# Patient Record
Sex: MALE | Race: WHITE | NOT HISPANIC OR LATINO | Employment: FULL TIME | URBAN - METROPOLITAN AREA
[De-identification: names, ages, dates, MRNs, and addresses within clinical notes are randomized per-mention and may not be internally consistent; named-entity substitution may affect disease eponyms.]

---

## 2020-10-24 ENCOUNTER — VIRTUAL VISIT (OUTPATIENT)
Dept: FAMILY MEDICINE | Facility: OTHER | Age: 40
End: 2020-10-24

## 2020-10-25 NOTE — PROGRESS NOTES
"Date: 10/24/2020 12:02:15  Clinician: Ariana Gilbert  Clinician NPI: [id number]  Patient: Faiza Alvarez  Patient : 1980  Patient Address: [address] [place] [address]  Patient Phone: [phone]  Visit Protocol: URI  Patient Summary:  Faiza is a 40 year old ( : 1980 ) male who initiated a [facility] Visit for COVID-19 (Coronavirus) evaluation and screening. When asked the question \"Please sign me up to receive news, health information and promotions. \", Faiza responded \"No\".    Faiza states his symptoms started 1-2 days ago.   His symptoms consist of a headache, enlarged lymph nodes, a cough, anosmia, myalgia, and malaise. Faiza also feels feverish but was unable to measure his temperature.   Symptom details     Cough: Faiza coughs a few times an hour and his cough is not more bothersome at night. Phlegm does not come into his throat when he coughs. He does not believe his cough is caused by post-nasal drip.     Headache: He states the headache is mild (1-3 on a 10 point pain scale).      Faiza denies having ear pain, wheezing, nasal congestion, vomiting, rhinitis, nausea, facial pain or pressure, chills, sore throat, teeth pain, ageusia, and diarrhea. He also denies taking antibiotic medication in the past month and having recent facial or sinus surgery in the past 60 days. He is not experiencing dyspnea.   Precipitating events  He has not recently been exposed to someone with influenza. Faiza has been in close contact with the following high risk individuals: children under the age of 5.   Pertinent COVID-19 (Coronavirus) information  In the past 14 days, Faiza has not worked in a congregate living setting.   He does not work or volunteer as healthcare worker or a  and does not work or volunteer in a healthcare facility.   Faiza also has not lived in a congregate living setting in the past 14 days. He does not live with a healthcare worker.   " Faiza has not had a close contact with a laboratory-confirmed COVID-19 patient within 14 days of symptom onset.   Since December 2019, Faiza and has not had upper respiratory infection or influenza-like illness. Has not been diagnosed with lab-confirmed COVID-19 test   Pertinent medical history  Faiza had 1 sinus infection within the past year.   Faiza does not need a return to work/school note.   Weight: 230 lbs   Faiza does not smoke or use smokeless tobacco.   Weight: 230 lbs    MEDICATIONS: No current medications, ALLERGIES: NKDA  Clinician Response:  Dear Faiza,   Your symptoms show that you may have coronavirus (COVID-19). This illness can cause fever, cough and trouble breathing. Many people get a mild case and get better on their own. Some people can get very sick.  What should I do?  We would like to test you for this virus.   1. Please call [phone] to schedule your visit. Explain that you were referred by [facility] to have a COVID-19 test. Be ready to share your [facility] visit ID number.  Please note that if you are assessed for Covid-19 testing and receive an order for testing from [facility], that the scheduling of your Covid test at [facility] may be delayed by three or four days or more due to limited availability for testing. Additional options for testing can be found on the [place] Covid-19 Response website. https://[place].gov/covid19/    The following will serve as your written order for this COVID Test, ordered by me, for the indication of suspected COVID [Z20.828]: The test will be ordered in [employer], our electronic health record, after you are scheduled. It will show as ordered and authorized by Kelton Agudelo MD.  Order: COVID-19 (Coronavirus) PCR for SYMPTOMATIC testing from [facility].   2. When it's time for your COVID test:  Stay at least 6 feet away from others. (If someone will drive you to your test, stay in the backseat, as far away from the  as you can.)   Cover your mouth and  "nose with a mask, tissue or washcloth.  Go straight to the testing site. Don't make any stops on the way there or back.      3.Starting now: Stay home and away from others (self-isolate) until:   You've had no fever---and no medicine that reduces fever---for one full day (24 hours). And...   Your other symptoms have gotten better. For example, your cough or breathing has improved. And...   At least 10 days have passed since your symptoms started.       During this time, don't leave the house except for testing or medical care.   Stay in your own room, even for meals. Use your own bathroom if you can.   Stay away from others in your home. No hugging, kissing or shaking hands. No visitors.  Don't go to work, school or anywhere else.    Clean \"high touch\" surfaces often (doorknobs, counters, handles, etc.). Use a household cleaning spray or wipes. You'll find a full list of  on the EPA website: www.epa.gov/pesticide-registration/list-n-disinfectants-use-against-sars-cov-2.   Cover your mouth and nose with a mask, tissue or washcloth to avoid spreading germs.  Wash your hands and face often. Use soap and water.  Caregivers in these groups are at risk for severe illness due to COVID-19:  o People 65 years and older  o People who live in a nursing home or long-term care facility  o People with chronic disease (lung, heart, cancer, diabetes, kidney, liver, immunologic)  o People who have a weakened immune system, including those who:   Are in cancer treatment  Take medicine that weakens the immune system, such as corticosteroids  Had a bone marrow or organ transplant  Have an immune deficiency  Have poorly controlled HIV or AIDS  Are obese (body mass index of 40 or higher)  Smoke regularly   o Caregivers should wear gloves while washing dishes, handling laundry and cleaning bedrooms and bathrooms.  o Use caution when washing and drying laundry: Don't shake dirty laundry, and use the warmest water setting that you " can.  o For more tips, go to www.cdc.gov/coronavirus/2019-ncov/downloads/10Things.pdf.    4.Sign up for [employer]. We know it's scary to hear that you might have COVID-19. We want to track your symptoms to make sure you're okay over the next 2 weeks. Please look for an email from [employer]---this is a free, online program that we'll use to keep in touch. To sign up, follow the link in the email. Learn more at [email or web address]  How can I take care of myself?   Get lots of rest. Drink extra fluids (unless a doctor has told you not to).   Take Tylenol (acetaminophen) for fever or pain. If you have liver or kidney problems, ask your family doctor if it's okay to take Tylenol.   Adults can take either:    650 mg (two 325 mg pills) every 4 to 6 hours, or...   1,000 mg (two 500 mg pills) every 8 hours as needed.    Note: Don't take more than 3,000 mg in one day. Acetaminophen is found in many medicines (both prescribed and over-the-counter medicines). Read all labels to be sure you don't take too much.   For children, check the Tylenol bottle for the right dose. The dose is based on the child's age or weight.    If you have other health problems (like cancer, heart failure, an organ transplant or severe kidney disease): Call your specialty clinic if you don't feel better in the next 2 days.       Know when to call 911. Emergency warning signs include:    Trouble breathing or shortness of breath Pain or pressure in the chest that doesn't go away Feeling confused like you haven't felt before, or not being able to wake up Bluish-colored lips or face.  Where can I get more information?    [facility] -- About COVID-19: [email or web address]   CDC -- What to Do If You're Sick: www.cdc.gov/coronavirus/2019-ncov/about/steps-when-sick.html   CDC -- Ending Home Isolation: www.cdc.gov/coronavirus/2019-ncov/hcp/disposition-in-home-patients.html   [facility] -- Caring for Someone:  www.cdc.gov/coronavirus/2019-ncov/if-you-are-sick/care-for-someone.html   [facility] -- Interim Guidance for Hospital Discharge to Home: www.health.[facility].[place]/diseases/coronavirus/hcp/hospdischarge.pdf   [facility] clinical trials (COVID-19 research studies): clinicalaffairs.[facility]-clinical-trials    Below are the COVID-19 hotlines at the [place] Department of Health ([facility]). Interpreters are available.    For health questions: Call 584-258-3876 or 1-678.999.4780 (7 a.m. to 7 p.m.) For questions about schools and childcare: Call 620-386-8834 or 1-274.210.5790 (7 a.m. to 7 p.m.)    Diagnosis: Contact with and (suspected) exposure to other viral communicable diseases  Diagnosis ICD: Z20.828

## 2020-10-27 DIAGNOSIS — Z20.822 SUSPECTED 2019 NOVEL CORONAVIRUS INFECTION: Primary | ICD-10-CM

## 2020-10-27 PROCEDURE — U0003 INFECTIOUS AGENT DETECTION BY NUCLEIC ACID (DNA OR RNA); SEVERE ACUTE RESPIRATORY SYNDROME CORONAVIRUS 2 (SARS-COV-2) (CORONAVIRUS DISEASE [COVID-19]), AMPLIFIED PROBE TECHNIQUE, MAKING USE OF HIGH THROUGHPUT TECHNOLOGIES AS DESCRIBED BY CMS-2020-01-R: HCPCS | Performed by: FAMILY MEDICINE

## 2020-10-28 ENCOUNTER — TELEPHONE (OUTPATIENT)
Dept: NURSING | Facility: CLINIC | Age: 40
End: 2020-10-28

## 2020-10-28 LAB
SARS-COV-2 RNA SPEC QL NAA+PROBE: ABNORMAL
SPECIMEN SOURCE: ABNORMAL

## 2020-10-28 NOTE — TELEPHONE ENCOUNTER
"Coronavirus (COVID-19) Notification    Caller Name (Patient, parent, daughter/son, grandparent, etc)  Patient    Reason for call  Notify of Positive Coronavirus (COVID-19) lab results, assess symptoms,  review [facility] recommendations    Lab Result    Lab test:  2019-nCoV rRt-PCR or SARS-CoV-2 PCR    Oropharyngeal AND/OR nasopharyngeal swabs is POSITIVE for 2019-nCoV RNA/SARS-COV-2 PCR (COVID-19 virus)    RN Recommendations/Instructions per [facility] Coronavirus COVID-19 recommendations    Brief introduction script  Introduce self then review script:  \"I am calling on behalf of [id number].  We were notified that your Coronavirus test (COVID-19) for was POSITIVE for the virus.  I have some information to relay to you but first I wanted to mention that the [place] Dept of Health will be contacting you shortly [it's possible MD already called Patient] to talk to you more about how you are feeling and other people you have had contact with who might now also have the virus.  Also, [facility] is Partnering with the [facility] for Covid-19 research, you may be contacted directly by research staff.\"    Assessment (Inquire about Patient's current symptoms)   Assessment   Current Symptoms at time of phone call: (if no symptoms, document No symptoms] Slight cough, can't smell   Symptoms onset (if applicable) Fever and headache, sinus pressure,      If at time of call, Patients symptoms hare worsened, the Patient should contact 911 or have someone drive them to Emergency Dept promptly:      If Patient calling 911, inform 911 personal that you have tested positive for the Coronavirus (COVID-19).  Place mask on and await 911 to arrive.    If Emergency Dept, If possible, please have another adult drive you to the Emergency Dept but you need to wear mask when in contact with other people.      Review information with Patient    Your result was positive. This means you have COVID-19 (coronavirus).  " We have sent you a letter that reviews the information that I'll be reviewing with you now.    How can I protect others?    If you have symptoms: stay home and away from others (self-isolate) until:    You've had no fever--and no medicine that reduces fever--for 1 full day (24 hours). And       Your other symptoms have gotten better. For example, your cough or breathing has improved. And     At least 10 days have passed since your symptoms started. (If you've been told by a doctor that you have a weak immune system, wait 20 days.)     If you don't have symptoms: Stay home and away from others (self-isolate) until at least 10 days have passed since your first positive COVID-19 test. (Date test collected)    During this time:    Stay in your own room, including for meals. Use your own bathroom if you can.    Stay away from others in your home. No hugging, kissing or shaking hands. No visitors.     Don't go to work, school or anywhere else.     Clean  high touch  surfaces often (doorknobs, counters, handles, etc.). Use a household cleaning spray or wipes. You'll find a full list on the EPA website at www.epa.gov/pesticide-registration/list-n-disinfectants-use-against-sars-cov-2.     Cover your mouth and nose with a mask, tissue or other face covering to avoid spreading germs.    Wash your hands and face often with soap and water.    Caregivers in these groups are at risk for severe illness due to COVID-19:  o People 65 years and older  o People who live in a nursing home or long-term care facility  o People with chronic disease (lung, heart, cancer, diabetes, kidney, liver, immunologic)  o People who have a weakened immune system, including those who:  - Are in cancer treatment  - Take medicine that weakens the immune system, such as corticosteroids  - Had a bone marrow or organ transplant  - Have an immune deficiency  - Have poorly controlled HIV or AIDS  - Are obese (body mass index of 40 or higher)  - Smoke  regularly    Caregivers should wear gloves while washing dishes, handling laundry and cleaning bedrooms and bathrooms.    Wash and dry laundry with special caution. Don't shake dirty laundry, and use the warmest water setting you can.    If you have a weakened immune system, ask your doctor about other actions you should take.    For more tips, go to www.cdc.gov/coronavirus/2019-ncov/downloads/10Things.pdf.    You should not go back to work until you meet the guidelines above for ending your home isolation. You don't need to be retested for COVID-19 before going back to work--studies show that you won't spread the virus if it's been at least 10 days since your symptoms started (or 20 days, if you have a weak immune system).    Employers: This document serves as formal notice of your employee's medical guidelines for going back to work. They must meet the above guidelines before going back to work in person.    How can I take care of myself?    1. Get lots of rest. Drink extra fluids (unless a doctor has told you not to).    2. Take Tylenol (acetaminophen) for fever or pain. If you have liver or kidney problems, ask your family doctor if it's okay to take Tylenol.     Take either:     650 mg (two 325 mg pills) every 4 to 6 hours, or     1,000 mg (two 500 mg pills) every 8 hours as needed.     Note: Don't take more than 3,000 mg in one day. Acetaminophen is found in many medicines (both prescribed and over-the-counter medicines). Read all labels to be sure you don't take too much.    For children, check the Tylenol bottle for the right dose (based on their age or weight).    3. If you have other health problems (like cancer, heart failure, an organ transplant or severe kidney disease): Call your specialty clinic if you don't feel better in the next 2 days.    4. Know when to call 911: Emergency warning signs include:    Trouble breathing or shortness of breath    Pain or pressure in the chest that doesn't go  away    Feeling confused like you haven't felt before, or not being able to wake up    Bluish-colored lips or face    5. Sign up for [employer]. We know it's scary to hear that you have COVID-19. We want to track your symptoms to make sure you're okay over the next 2 weeks. Please look for an email from [employer]--this is a free, online program that we'll use to keep in touch. To sign up, follow the link in the email. Learn more at [email or web address].    Where can I get more information?    [facility]: [email or web address]    Coronavirus Basics: www.health.[facility].[place]./diseases/coronavirus/basics.html    What to Do If You're Sick: www.cdc.gov/coronavirus/2019-ncov/about/steps-when-sick.html    Ending Home Isolation: www.cdc.gov/coronavirus/2019-ncov/hcp/disposition-in-home-patients.html     Caring for Someone with COVID-19: www.cdc.gov/coronavirus/2019-ncov/if-you-are-sick/care-for-someone.html     [facility] clinical trials (COVID-19 research studies): clinicalaffairs.[facility]/n-clinical-trials     A Positive COVID-19 letter will be sent via [employer] or the mail. (Exception, no letters sent to Presurgerical/Preprocedure Patients)    [Name]  Elizabeth Manzano RN/[facility], 10/28/20, 4:06 PM.

## 2021-02-24 ENCOUNTER — OFFICE VISIT (OUTPATIENT)
Dept: FAMILY MEDICINE | Facility: CLINIC | Age: 41
End: 2021-02-24
Payer: COMMERCIAL

## 2021-02-24 VITALS
TEMPERATURE: 96.1 F | OXYGEN SATURATION: 95 % | BODY MASS INDEX: 30.09 KG/M2 | SYSTOLIC BLOOD PRESSURE: 128 MMHG | RESPIRATION RATE: 14 BRPM | HEART RATE: 92 BPM | DIASTOLIC BLOOD PRESSURE: 86 MMHG | WEIGHT: 227 LBS | HEIGHT: 73 IN

## 2021-02-24 DIAGNOSIS — R53.83 OTHER FATIGUE: ICD-10-CM

## 2021-02-24 DIAGNOSIS — Z13.220 SCREENING CHOLESTEROL LEVEL: ICD-10-CM

## 2021-02-24 DIAGNOSIS — Z13.1 SCREENING FOR DIABETES MELLITUS: ICD-10-CM

## 2021-02-24 DIAGNOSIS — R06.83 SNORING: Primary | ICD-10-CM

## 2021-02-24 LAB
CHOLEST SERPL-MCNC: 233 MG/DL
HBA1C MFR BLD: 5.4 % (ref 0–5.6)
HDLC SERPL-MCNC: 49 MG/DL
LDLC SERPL CALC-MCNC: 163 MG/DL
NONHDLC SERPL-MCNC: 184 MG/DL
TRIGL SERPL-MCNC: 104 MG/DL

## 2021-02-24 PROCEDURE — 99213 OFFICE O/P EST LOW 20 MIN: CPT | Performed by: FAMILY MEDICINE

## 2021-02-24 PROCEDURE — 36415 COLL VENOUS BLD VENIPUNCTURE: CPT | Performed by: FAMILY MEDICINE

## 2021-02-24 PROCEDURE — 83036 HEMOGLOBIN GLYCOSYLATED A1C: CPT | Performed by: FAMILY MEDICINE

## 2021-02-24 PROCEDURE — 80061 LIPID PANEL: CPT | Performed by: FAMILY MEDICINE

## 2021-02-24 ASSESSMENT — MIFFLIN-ST. JEOR: SCORE: 1987.51

## 2021-02-24 NOTE — PROGRESS NOTES
"    Assessment & Plan     Snoring  Other fatigue  Stop Bang score of 4.  Strong family history of obstructive sleep apnea.  He would like referral to sleep medicine for further evaluation and cares.  Order placed today.  - SLEEP EVALUATION & MANAGEMENT REFERRAL - ADULT -[facility] [place]  [phone] (Age 18 and up)    Screening cholesterol level  Due for cholesterol screening will obtain today  - Lipid panel reflex to direct LDL Fasting    Screening for diabetes mellitus  Strong family history of diabetes, due for screening, will obtain today.  - Hemoglobin A1c    Follow up as needed.       Matt Grove, DO  [facility] GÉNESIS Kendall is a 41 year old who presents for the following health issues   HPI     Chief Complaint   Patient presents with     Sleep study     requesting a sleep study. Recently got a smart watch, and it says he gets 1/2 hr of sleep each night and waking up 30-50 times a night. Also states that he has some dry mouth - states that his wife states he is snoring alot as well. States that his fathers and uncles all have sleep apnea as well.        Usually sleeps 8 hours per night.   Not real refreshed in the morning   Wife has complained about snoring   Stop bang score of 4  Will take naps during the day occasionally.   Drinks caffeine to wake up in the morning 2-3 cups  Minimal alcohol  1-2 times per month   No drugs   Non-smoker      Preventive Health   Due for cholesterol screening. Also with strong family history of diabetes.   Hep C and HIV testing deferred by patient.     Review of Systems   Constitutional, HEENT, cardiovascular, pulmonary, gi and gu systems are negative, except as otherwise noted.      Objective    /86 (BP Location: Left arm, Patient Position: Sitting, Cuff Size: Adult Large)   Pulse 92   Temp 96.1  F (35.6  C) (Tympanic)   Resp 14   Ht 1.861 m (6' 1.25\")   Wt 103 kg (227 lb)   SpO2 95%   BMI 29.74 kg/m    Body " mass index is 29.74 kg/m .  Physical Exam   General: alert, cooperative, no acute distress   HEENT: NC/AT, oropharynx clear, non-erythematous. No tonsil enlargement uvula midline.   Neck: 43 cm circumference.   CV: RRR, no murmur  Resp: non-labored breathing, clear to auscultation, no wheezing or rales

## 2021-02-24 NOTE — LETTER
February 24, 2021      aFiza Alvarez  [address] [address] [address]        Dear ,    We are writing to inform you of your test results.    Most recent diabetic testing has returned satisfactory and is within normal range.  Cholesterol testing shows a slightly elevated total cholesterol 233, and elevated LDL or lousy cholesterol at 163.  No need to start any medications at this time, however continue to work on healthy diet and exercise at least 150 minutes/week.     Dr. Matt Grove     Resulted Orders   Lipid panel reflex to direct LDL Fasting   Result Value Ref Range    Cholesterol 233 (H) <200 mg/dL      Comment:      Desirable:       <200 mg/dl    Triglycerides 104 <150 mg/dL    HDL Cholesterol 49 >39 mg/dL    LDL Cholesterol Calculated 163 (H) <100 mg/dL      Comment:      Above desirable:  100-129 mg/dl  Borderline High:  130-159 mg/dL  High:             160-189 mg/dL  Very high:       >189 mg/dl      Non HDL Cholesterol 184 (H) <130 mg/dL      Comment:      Above Desirable:  130-159 mg/dl  Borderline high:  160-189 mg/dl  High:             190-219 mg/dl  Very high:       >219 mg/dl     Hemoglobin A1c   Result Value Ref Range    Hemoglobin A1C 5.4 0 - 5.6 %      Comment:      Normal <5.7% Prediabetes 5.7-6.4%  Diabetes 6.5% or higher - adopted from ADA   consensus guidelines.         If you have any questions or concerns, please call the clinic at the number listed above.       Sincerely,      Matt Grove, DO

## 2021-02-24 NOTE — RESULT ENCOUNTER NOTE
Please call patient.     Most recent diabetic testing has returned satisfactory and is within normal range.  Cholesterol testing shows a slightly elevated total cholesterol 233, and elevated LDL or lousy cholesterol at 163.  No need to start any medications at this time, however continue to work on healthy diet and exercise at least 150 minutes/week.    Dr. Matt Grove

## 2021-03-12 ENCOUNTER — VIRTUAL VISIT (OUTPATIENT)
Dept: SLEEP MEDICINE | Facility: CLINIC | Age: 41
End: 2021-03-12
Attending: FAMILY MEDICINE
Payer: COMMERCIAL

## 2021-03-12 VITALS — WEIGHT: 225 LBS | BODY MASS INDEX: 28.88 KG/M2 | HEIGHT: 74 IN

## 2021-03-12 DIAGNOSIS — E66.3 OVERWEIGHT (BMI 25.0-29.9): ICD-10-CM

## 2021-03-12 DIAGNOSIS — G47.10 HYPERSOMNIA: ICD-10-CM

## 2021-03-12 DIAGNOSIS — R06.83 SNORING: Primary | ICD-10-CM

## 2021-03-12 PROCEDURE — 99204 OFFICE O/P NEW MOD 45 MIN: CPT | Mod: 95 | Performed by: PEDIATRICS

## 2021-03-12 RX ORDER — IBUPROFEN 200 MG
600 TABLET ORAL DAILY
COMMUNITY
End: 2022-06-22

## 2021-03-12 ASSESSMENT — MIFFLIN-ST. JEOR: SCORE: 1982.4

## 2021-03-12 NOTE — PATIENT INSTRUCTIONS
Your BMI is Body mass index is 29.28 kg/m .  Weight management is a personal decision.  If you are interested in exploring weight loss strategies, the following discussion covers the approaches that may be successful. Body mass index (BMI) is one way to tell whether you are at a healthy weight, overweight, or obese. It measures your weight in relation to your height.  A BMI of 18.5 to 24.9 is in the healthy range. A person with a BMI of 25 to 29.9 is considered overweight, and someone with a BMI of 30 or greater is considered obese. More than two-thirds of American adults are considered overweight or obese.  Being overweight or obese increases the risk for further weight gain. Excess weight may lead to heart disease and diabetes.  Creating and following plans for healthy eating and physical activity may help you improve your health.  Weight control is part of healthy lifestyle and includes exercise, emotional health, and healthy eating habits. Careful eating habits lifelong are the mainstay of weight control. Though there are significant health benefits from weight loss, long-term weight loss with diet alone may be very difficult to achieve- studies show long-term success with dietary management in less than 10% of people. Attaining a healthy weight may be especially difficult to achieve in those with severe obesity. In some cases, medications, devices and surgical management might be considered.  What can you do?  If you are overweight or obese and are interested in methods for weight loss, you should discuss this with your provider.     Consider reducing daily calorie intake by 500 calories.     Keep a food journal.     Avoiding skipping meals, consider cutting portions instead.    Diet combined with exercise helps maintain muscle while optimizing fat loss. Strength training is particularly important for building and maintaining muscle mass. Exercise helps reduce stress, increase energy, and improves fitness.  Increasing exercise without diet control, however, may not burn enough calories to loose weight.       Start walking three days a week 10-20 minutes at a time    Work towards walking thirty minutes five days a week     Eventually, increase the speed of your walking for 1-2 minutes at time    In addition, we recommend that you review healthy lifestyles and methods for weight loss available through the National Institutes of Health patient information sites:  http://win.niddk.nih.gov/publications/index.htm    And look into health and wellness programs that may be available through your health insurance provider, employer, local community center, or tosha club.    Weight management plan: Patient was referred to their PCP to discuss a diet and exercise plan.

## 2021-03-12 NOTE — PROGRESS NOTES
Faiza is a 41 year old who is being evaluated via a billable video visit.      How would you like to obtain your AVS? Mail a copy  If the video visit is dropped, the invitation should be resent by: Text to cell phone: [phone]  Will anyone else be joining your video visit? No   Ariana Greco CMA      Video Start Time: 8:00 AM  Video-Visit Details    Type of service:  Video Visit    Video End Time:8:17 AM    Originating Location (pt. Location): Home    Distant Location (provider location):  [facility]     Platform used for Video Visit: "[employer]     [facility]  Outpatient Sleep Medicine Consultation, New Patient      Name: Faiza Alvarez MRN# [id number]   Age: 41 year old YOB: 1980     Date of Consultation: March 12, 2021  Consultation is requested by: Matt Grove DO  [address] [place] [address]  Primary care provider: Matt Grove      Assessment and Plan:     1. Snoring  Patient is being evaluated for Obstructive Sleep Apnea (SHANE).  Risk factors for SHANE include:  snoring, excessive daytime sleepiness/subjective tiredness, male gender (STOP BANG score = 3).  Recommend HST since patient is at risk for SHANE without any relevant comorbid conditions.  He will follow up with me approximately two weeks after his sleep study has been completed to review the results and discuss plan of care.    2. Hypersomnia  The patient was strongly advised to avoid driving, operating any heavy machinery or engaging in other hazardous situations while drowsy or sleepy.  Patient was counseled on the importance of driving while alert and to pull over if drowsy.     3. Overweight (BMI 25.0-29.9)  Patients with overweight/obesity are at higher risk for sleep apnea due to fat deposition in the posterior airway and tongue.  Sleep disruption associated with untreated sleep disorders (including, but not limited to, sleep apnea) can  "cause hormonal disruption that predisposes individuals to gain weight.  Weight loss can lead to improvement in sleep apnea severity and sleep quality.      History:     Faiza Alvarez is a 41 year old male whose visit was conducted via video today.  The visit was converted to telephone due to technical difficulties with the audio.    Patient reports snoring which has been present for at least a year.  He does not wake up gasping during sleep and his wife has not observed apneic events.  However, his sleep is unrefreshing. He gets between 7-1/2 to 9 hours of sleep at night but does not awaken feeling refreshed in the morning.  His score on the [place] Sleepiness Scale is elevated at 14 out of 24.  He denies drowsy driving but has trouble focusing during his stay and has significantly reduced energy in the evening which has been frustrating to him.  He has a watch with a sleep tracker which reports suboptimal sleep data.  He finds it necessary to drink coffee in the morning in order to promote alertness.  Alcohol consumption is 1 or 2 times per month.  He has a strong family history of sleep disordered breathing.  He reports that his father, all of his father's brothers, and other family members use CPAP therapy.    Medical history is significant for overweight with a BMI of 29.3 kg/m  and hypercholesterolemia.  Patient takes no prescription medications and has never been diagnosed with hypertension.  Recent HbA1c was normal.    Medications:     Current Outpatient Medications   Medication Sig     ibuprofen (ADVIL/MOTRIN) 200 MG tablet Take 600 mg by mouth daily     No current facility-administered medications for this visit.         No Known Allergies    Past Medical History:     No past medical history on file.     Past Surgical History:      No past surgical history on file.    Physical Examination:   Ht 1.867 m (6' 1.5\")   Wt 102.1 kg (225 lb)   BMI 29.28 kg/m            Data: All pertinent previous " laboratory data reviewed     No results found for: PH, PHARTERIAL, PO2, ME0ENFCTSKE, SAT, PCO2, HCO3, BASEEXCESS, LILIAN, BEB  No results found for: TSH  No results found for: GLC  No results found for: HGB  No results found for: BUN, CR  No results found for: AST, ALT, GGT, ALKPHOS, BILITOTAL, BILICONJ, BILIDIRECT, J LUIS  No results found for: UAMP, UBARB, BENZODIAZEUR, UCANN, UCOC, OPIT, UPCP    Yesica Chavez MD   3/12/2021   [address], [address]  [place], [place] [id number] [phone]    Copy to: Matt Grove

## 2021-04-08 ENCOUNTER — OFFICE VISIT (OUTPATIENT)
Dept: SLEEP MEDICINE | Facility: CLINIC | Age: 41
End: 2021-04-08
Payer: COMMERCIAL

## 2021-04-08 DIAGNOSIS — E66.3 OVERWEIGHT (BMI 25.0-29.9): ICD-10-CM

## 2021-04-08 DIAGNOSIS — G47.10 HYPERSOMNIA: ICD-10-CM

## 2021-04-08 DIAGNOSIS — R06.83 SNORING: ICD-10-CM

## 2021-04-09 ENCOUNTER — DOCUMENTATION ONLY (OUTPATIENT)
Dept: SLEEP MEDICINE | Facility: CLINIC | Age: 41
End: 2021-04-09
Payer: COMMERCIAL

## 2021-04-09 ENCOUNTER — DOCUMENTATION ONLY (OUTPATIENT)
Dept: SLEEP MEDICINE | Facility: CLINIC | Age: 41
End: 2021-04-09

## 2021-04-09 PROCEDURE — G0399 HOME SLEEP TEST/TYPE 3 PORTA: HCPCS | Performed by: PEDIATRICS

## 2021-04-09 NOTE — PROGRESS NOTES
This HSAT was performed using a Noxturnal T3 device which recorded snore, sound, movement activity, body position, nasal pressure, oronasal thermal airflow, pulse, oximetry and both chest and abdominal respiratory effort. HSAT data was restricted to the time patient states they were in bed.     HSAT was scored using 1B 4% hypopnea rule.     HST AHI (Non-PAT): 44.7  Snoring was reported as mild.  Time with SpO2 below 89% was 12 minutes.   Overall signal quality was good     Pt will follow up with sleep provider to determine appropriate therapy.

## 2021-04-09 NOTE — PROCEDURES
"HOME SLEEP STUDY INTERPRETATION    Patient: Faiza Alvarez  MRN: [id number]  YOB: 1980  Study Date: 4/9/2021  Referring Provider: Matt Grove  Ordering Provider: Yesica Chavez MD     Indications for Home Study: Faiza Alvarez is a 41 year old male with snoring which has been present for at least a year.  He does not wake up gasping during sleep and his wife has not observed apneic events.  However, his sleep is unrefreshing. He gets between 7-1/2 to 9 hours of sleep at night but does not awaken feeling refreshed in the morning.  His score on the [place] Sleepiness Scale is elevated at 14 out of 24.  He denies drowsy driving but has trouble focusing during his stay and has significantly reduced energy in the evening which has been frustrating to him.  He has a watch with a sleep tracker which reports suboptimal sleep data.  He finds it necessary to drink coffee in the morning in order to promote alertness.  Alcohol consumption is 1 or 2 times per month.  He has a strong family history of sleep disordered breathing.  He reports that his father, all of his father's brothers, and other family members use CPAP therapy.  Medical history is significant for overweight with a BMI of 29.3 kg/m  and hypercholesterolemia.  Patient takes no prescription medications and has never been diagnosed with hypertension.  Recent HbA1c was normal.     Estimated body mass index is 29.28 kg/m  as calculated from the following:    Height as of 3/12/21: 1.867 m (6' 1.5\").    Weight as of 3/12/21: 102.1 kg (225 lb).  Total score - Mertens: 14 (3/11/2021  4:00 PM)  STOP-BANG: 3/8    Data: A full night home sleep study was performed recording the standard physiologic parameters including body position, movement, sound, nasal pressure, thermal oral airflow, chest and abdominal movements with respiratory inductance plethysmography, and oxygen saturation by pulse oximetry. Pulse rate was estimated by oximetry " recording. This study was considered adequate based on > 4 hours of quality oximetry and respiratory recording. As specified by the AASM Manual for the Scoring of Sleep and Associated events, version 2.3, Rule VIII.D 1B, 4% oxygen desaturation scoring for hypopneas is used as a standard of care on all home sleep apnea testing.    Analysis Time:  381.6 minutes    Respiration:   Sleep Associated Hypoxemia: sustained hypoxemia was present. Baseline oxygen saturation was 93%.  Time with saturation less than or equal to 88% was 7 minutes. The lowest oxygen saturation was 80%.   Snoring: Snoring was present.  Respiratory events: The home study revealed a presence of 50 obstructive apneas and 103 mixed and central apneas. There were 129 hypopneas resulting in a combined apnea/hypopnea index [AHI] of 44.7 events per hour.  AHI was 44.7 per hour supine (patient spent the entire night in the supine position).   Pattern: Excluding events noted above, respiratory rate and pattern was Normal.    Position: Percent of time spent: supine - 99.2%, prone - 0%, on left - 0%, on right - 0%.    Heart Rate: By pulse oximetry normal rate was noted.     Assessment:   Severe obstructive sleep apnea.  Sleep associated hypoxemia was present.    Recommendations:  Consider auto-CPAP at 5-15 cmH2O.  Suggest optimizing sleep hygiene and avoiding sleep deprivation.  Weight management.    Diagnosis Code(s): Obstructive Sleep Apnea G47.33, Hypoxemia G47.36    Yesica Chavez MD, April 9, 2021   Diplomate, American Board of Pediatrics, Sleep Medicine

## 2021-04-19 VITALS — HEIGHT: 74 IN | BODY MASS INDEX: 28.88 KG/M2 | WEIGHT: 225 LBS

## 2021-04-19 ASSESSMENT — MIFFLIN-ST. JEOR: SCORE: 1982.47

## 2021-04-19 NOTE — PROGRESS NOTES
"Faiza Alvarez is a 41 year old male being evaluated via a billable telephone visit.     \"This telephone visit will be conducted via a call between you and your physician/provider. We have found that certain health care needs can be provided without the need for an in-person visit or physical exam.  This service lets us provide the care you need with a telephone conversation.  If a prescription is necessary we can send it directly to your pharmacy.  If lab work is needed we can place an order for that and you can then stop by our lab to have the test done at a later time.\"    Telephone visits are billed at different rates depending on your insurance coverage.  Please reach out to your insurance provider with any questions.    Patient has given verbal consent for  a Telephone visit? Yes    What telephone number would you like your provider to contact at at:  [phone]    How would you like to obtain your AVS? Mail a copy    Ashley Herr MA    Telephone Visit Details:     Telephone Visit Start Time: 9:00 AM    Telephone Visit End Time:9:11 AM      [facility]  Outpatient Sleep Medicine Encounter, Established Patient      Name: Faiza Alvarez MRN# [id number]   Age: 41 year old YOB: 1980     Date of Visit: April 21, 2021  Primary care provider: Matt Grove    Assessment and Plan:     1. Snoring  Today I submitted a prescription for AutoPAP therapy, 5 to 15 cm water pressure, patient's choice of PAP mask, and all associated consumable supplies.  I counseled the patient to use the treatment nightly and throughout the entire sleep period.  The Sleep Therapy Management (STM) group will contact the patient as per protocol for support and troubleshooting.  Patient will be asked to schedule a follow-up appointment to determine clinical progress with the therapy and assess adherence.    2. Hypoxemia  I expect hypoxemia to resolve with adequate " treatment of SHANE; if there are any persistent symptoms further evaluation may be necessary.    3. Hypersomnia  The patient is advised to avoid driving, operating any heavy machinery or engaging in other hazardous situations while drowsy or sleepy.      History:   Faiza Alvarez is a 41 year old male whose visit today is to review the results of a home sleep apnea test.  He has had snoring which has been present for at least a year.  He does not wake up gasping during sleep and his wife has not observed apneic events.  However, his sleep is unrefreshing. He gets between 7-1/2 to 9 hours of sleep at night but does not awaken feeling refreshed in the morning.  His score on the [place] Sleepiness Scale is elevated at 14 out of 24.  He denies drowsy driving but has trouble focusing during his day and has significantly reduced energy in the evening.  He finds it necessary to drink coffee in the morning in order to promote alertness.  Alcohol consumption is 1 or 2 times per month.  He has a strong family history of sleep disordered breathing.  He reports that his father, all of his father's brothers, and other family members use CPAP therapy.  Medical history is significant for overweight with a BMI of 29.3 kg/m  and hypercholesterolemia.  Patient takes no prescription medications and has never been diagnosed with hypertension.  Recent HbA1c was normal.     I reviewed the home sleep apnea test conducted on April 9, 2021.  The test shows severe obstructive sleep apnea with an AHI of 44.7 events per hour.  There is also mild hypoxemia due to the cumulative effects of episodic desaturations.  Time with saturation less than or equal to 88% was 7 minutes.  The oxyhemoglobin saturation dia was 80%.  The patient slept in the supine position.    Recommended AutoPap therapy at 5 to 15 cm water pressure to treat sleep disordered breathing.    Medications:     Current Outpatient Medications   Medication Sig     ibuprofen  "(ADVIL/MOTRIN) 200 MG tablet Take 600 mg by mouth daily     No current facility-administered medications for this visit.         No Known Allergies    Past Medical History:   No past medical history on file.     Physical Examination:   Ht 1.867 m (6' 1.5\")   Wt 102.1 kg (225 lb)   BMI 29.28 kg/m           This note has been dictated using voice recognition software. Any grammatical, typographical, or context distortions are unintentional and inherent to the software.    Yesica Chavez MD   4/21/2021   [address], [facility] [address]  [place] [address]   [phone]    Copy to: Matt Grove   "

## 2021-04-21 ENCOUNTER — VIRTUAL VISIT (OUTPATIENT)
Dept: SLEEP MEDICINE | Facility: CLINIC | Age: 41
End: 2021-04-21
Payer: COMMERCIAL

## 2021-04-21 DIAGNOSIS — R06.83 SNORING: Primary | ICD-10-CM

## 2021-04-21 DIAGNOSIS — G47.10 HYPERSOMNIA: ICD-10-CM

## 2021-04-21 DIAGNOSIS — R09.02 HYPOXEMIA: ICD-10-CM

## 2021-04-21 PROCEDURE — 99213 OFFICE O/P EST LOW 20 MIN: CPT | Mod: 95 | Performed by: PEDIATRICS

## 2021-04-28 ENCOUNTER — DOCUMENTATION ONLY (OUTPATIENT)
Dept: SLEEP MEDICINE | Facility: CLINIC | Age: 41
End: 2021-04-28

## 2021-04-28 NOTE — PROGRESS NOTES
Patient was offered choice of vendor and chose [facility].  Patient Faiza Alvarez was set up at [place]  on April 28, 2021. Patient received a Resmed AirSense 10 Auto. Pressures were set at 5-15 cm H2O.   Patient s ramp is off cm H2O for Off and FLEX/EPR is 2.  Patient received a Resmed Mask name: [id number]  Nasal mask size Medium, heated tubing and heated humidifier.  Patient does need to meet compliance.   Светлана Lopez

## 2021-05-03 ENCOUNTER — DOCUMENTATION ONLY (OUTPATIENT)
Dept: SLEEP MEDICINE | Facility: CLINIC | Age: 41
End: 2021-05-03

## 2021-05-03 NOTE — PROGRESS NOTES
3 day Sleep therapy management telephone visit    Diagnostic AHI:   44.7  HST    Confirmed with patient at time of call- Yes Patient is still interested in STM service       Subjective measures:  Pt states things are going well and has no issues or complaints.  Pt is benefiting from therapy.      Replacement device: No  STM ordered by provider: Yes    Objective data     Order Settings for PAP  CPAP min 5    CPAP max 15        Device settings from machine CPAP min 5.0     CPAP max 15.0      EPR Setting TWO    RESMED soft response  OFF     Assessment: Nightly usage over four hours Elevated CA     Action plan: Patient to have 14 day STM visit. Patient has a follow up visit scheduled:   yes within 31-90 days of set up.      Total time spent on accessing and  interpreting remote patient PAP therapy data  10 minutes    Total time spent counseling, coaching  and reviewing PAP therapy data with patient 3minutes    [id number] no

## 2021-05-13 ENCOUNTER — DOCUMENTATION ONLY (OUTPATIENT)
Dept: SLEEP MEDICINE | Facility: CLINIC | Age: 41
End: 2021-05-13

## 2021-05-13 NOTE — PROGRESS NOTES
14  DAY STM VISIT    Diagnostic AHI:   44.7  HST    Subjective measures:  Patient reports that he feels things are going well.  He feels like he needs more pressure during the night, but he wants to wait until 30 day to see about making any changes to his current settings.      Assessment: Pt not meeting objective benchmarks for AHI, primarly central events. Patient failing following subjective benchmarks: pressure issues    Action plan: pt to have 30 day STM visit.      Device type: Auto-CPAP    PAP settings: CPAP min 5.0 cm  H20       CPAP max 15.0 cm  H20      95th% pressure 9.6 cm  H20        RESMED EPR level Setting: TWO    RESMED Soft response setting:  OFF    Mask type:  Nasal Mask    Objective measures: 14 day rolling measures      Compliance  100 %      Leak  4.88  lpm  last  upload      AHI 12.01   last  Upload-primary central events       Average number of minutes 454      Objective measure goal  Compliance   Goal >70%  Leak   Goal < 24 lpm  AHI  Goal < 5  Usage  Goal >240        Total time spent on accessing and interpreting remote patient PAP therapy data  10 minutes    Total time spent counseling, coaching  and reviewing PAP therapy data with patient 3 minutes    [id number]  [id number]  no (3 day STM)

## 2021-06-03 ENCOUNTER — DOCUMENTATION ONLY (OUTPATIENT)
Dept: SLEEP MEDICINE | Facility: CLINIC | Age: 41
End: 2021-06-03

## 2021-06-03 DIAGNOSIS — G47.33 OSA (OBSTRUCTIVE SLEEP APNEA): Primary | ICD-10-CM

## 2021-06-03 NOTE — Clinical Note
Hi, can you please sign pended order for pressure increase for patient comfort? Resmed 6-15 cm h2o.  Residual AHI is slightly elevated, primarily central apneas.

## 2021-06-03 NOTE — PROGRESS NOTES
30 DAY STM VISIT    Diagnostic AHI:  44.7 HST     Subjective measures:  Patient is waking up during the night feeling like he can't breath. He said this happens while on his back and his side.   Patient feels like pressure is a little low.    Assessment: Pt not meeting objective benchmarks for AHI. Upload is showing primarily central apneas.  Patient failing following subjective benchmarks: pressure issues  Action plan: pended order placed to provider for pressure change to 6-15 cm h2o.  Patient has scheduled a follow up visit with Dr. Phan on 6/18/21.   Device type: Auto-CPAP  PAP settings: CPAP min 5.0 cm  H20     CPAP max 15.0 cm  H20    95th% pressure 9.5 cm  H20      RESMED EPR level Setting: TWO    RESMED Soft response setting:  OFF  Mask type:  Nasal Mask  Objective measures: 14 day rolling measures      Compliance  92 %      Leak  7.44 lpm  last  upload      AHI 9.82   last  upload      Average number of minutes 409      Objective measure goal  Compliance   Goal >70%  Leak   Goal < 24 lpm  AHI  Goal < 5  Usage  Goal >240        Total time spent on accessing and interpreting remote patient PAP therapy data  5 minutes    Total time spent counseling, coaching  and reviewing PAP therapy data with patient  10 minutes     [id number] this call  [id number] no  at 3 or 14 day [facility]

## 2021-06-18 ENCOUNTER — VIRTUAL VISIT (OUTPATIENT)
Dept: SLEEP MEDICINE | Facility: CLINIC | Age: 41
End: 2021-06-18
Payer: COMMERCIAL

## 2021-06-18 VITALS — WEIGHT: 235 LBS | HEIGHT: 74 IN | BODY MASS INDEX: 30.16 KG/M2

## 2021-06-18 DIAGNOSIS — G47.33 OSA (OBSTRUCTIVE SLEEP APNEA): Primary | ICD-10-CM

## 2021-06-18 PROCEDURE — 99213 OFFICE O/P EST LOW 20 MIN: CPT | Mod: 95 | Performed by: PEDIATRICS

## 2021-06-18 ASSESSMENT — MIFFLIN-ST. JEOR: SCORE: 2035.7

## 2021-06-18 NOTE — PATIENT INSTRUCTIONS
Your BMI is Body mass index is 30.17 kg/m .  Weight management is a personal decision.  If you are interested in exploring weight loss strategies, the following discussion covers the approaches that may be successful. Body mass index (BMI) is one way to tell whether you are at a healthy weight, overweight, or obese. It measures your weight in relation to your height.  A BMI of 18.5 to 24.9 is in the healthy range. A person with a BMI of 25 to 29.9 is considered overweight, and someone with a BMI of 30 or greater is considered obese. More than two-thirds of American adults are considered overweight or obese.  Being overweight or obese increases the risk for further weight gain. Excess weight may lead to heart disease and diabetes.  Creating and following plans for healthy eating and physical activity may help you improve your health.  Weight control is part of healthy lifestyle and includes exercise, emotional health, and healthy eating habits. Careful eating habits lifelong are the mainstay of weight control. Though there are significant health benefits from weight loss, long-term weight loss with diet alone may be very difficult to achieve- studies show long-term success with dietary management in less than 10% of people. Attaining a healthy weight may be especially difficult to achieve in those with severe obesity. In some cases, medications, devices and surgical management might be considered.  What can you do?  If you are overweight or obese and are interested in methods for weight loss, you should discuss this with your provider.     Consider reducing daily calorie intake by 500 calories.     Keep a food journal.     Avoiding skipping meals, consider cutting portions instead.    Diet combined with exercise helps maintain muscle while optimizing fat loss. Strength training is particularly important for building and maintaining muscle mass. Exercise helps reduce stress, increase energy, and improves fitness.  Increasing exercise without diet control, however, may not burn enough calories to loose weight.       Start walking three days a week 10-20 minutes at a time    Work towards walking thirty minutes five days a week     Eventually, increase the speed of your walking for 1-2 minutes at time    In addition, we recommend that you review healthy lifestyles and methods for weight loss available through the National Institutes of Health patient information sites:  http://win.niddk.nih.gov/publications/index.htm    And look into health and wellness programs that may be available through your health insurance provider, employer, local community center, or tosha club.    Weight management plan: Patient was referred to their PCP to discuss a diet and exercise plan.

## 2021-06-18 NOTE — PROGRESS NOTES
Does patient have any form of state insurance? n    Do you have wifi? y  Do you have a smart phone? y  Can you download an charissa on your phone comfortably with out assistance? y  Can you watch a Youtube video? y    Faiza is a 41 year old who is being evaluated via a billable video visit.      How would you like to obtain your AVS? MyChart  If the video visit is dropped, the invitation should be resent by: Text to cell phone: [phone]   Will anyone else be joining your video visit? No      Lori Garcia, NIALL on 6/18/2021 at 10:15 AM    Video Start Time: 10:59 AM  Video-Visit Details    Type of service:  Video Visit    Video End Time:11:11 AM    Originating Location (pt. Location): Home    Distant Location (provider location):  [facility]     Platform used for Video Visit: [facility]  Outpatient Sleep Medicine Encounter, Established Patient      Name: Faiza Alvarez MRN# [id number]   Age: 41 year old YOB: 1980     Date of Visit: June 18, 2021  Primary care provider: Matt Grove    Assessment and Plan:     1. SHANE (obstructive sleep apnea)  I advised the patient to continue PAP therapy use nightly and throughout the entire sleep period.  He has improvement of air hunger with increasing the lower limit of his pressure range to 6.  Since his air hunger is not completely resolved, I increased the lower limit to 7 cm water.  I also reduced the upper limit to 11 cm water.  I expect to see the number of central apneas reduce over time.  At this point there is no cause for concern.  Follow up in 2 to 3 months for recheck.    History:     Faiza Alvarez is a 41 year old male whose visit was conducted via video today.  This is his first follow-up visit since initiating treatment with CPAP for obstructive sleep apnea.  His score on the [place] Sleepiness Scale is 6 out of 24, improved from 14 out of 24 prior  "to treatment.  He was set with a ResMed air sense 10 AutoPap on April 28, 2021.  He uses a nasal mask.  Notes from the sleep therapy management group indicate that he has had the sensation the pressure is not high enough; the lower limit of his pressure setting was increased to 6 cm water.  He reports improvement from this but still has occasional awakenings feeling that the pressure is too low.  At this point it is more difficult for him to return to sleep since he has already satisfied most of his sleep meet.  Otherwise he seems to benefit clinically from PAP therapy with improved sleep quality and daytime alertness.    Patient meets criteria for compliance with CPAP (see below).    ResMed   Auto-PAP 6.0 - 15.0 cmH2O 30 day usage data:    93% of days with > 4 hours of use. 0/30 days with no use.   Average use 402 minutes per day.   95%ile Leak 7.05 L/min.   CPAP 95% pressure 9.4 cm.   AHI 8.77 events per hour.     A home sleep apnea test was conducted on April 9, 2021.  The test shows severe obstructive sleep apnea with an AHI of 44.7 events per hour.  There is also mild hypoxemia due to the cumulative effects of episodic desaturations.  Time with saturation less than or equal to 88% was 7 minutes.  The oxyhemoglobin saturation dia was 80%.  The patient slept in the supine position.    Medications:     Current Outpatient Medications   Medication Sig     ibuprofen (ADVIL/MOTRIN) 200 MG tablet Take 600 mg by mouth daily     No current facility-administered medications for this visit.         No Known Allergies    Past Medical History:   No past medical history on file.     Physical Examination:   Ht 1.88 m (6' 2\")   Wt 106.6 kg (235 lb)   BMI 30.17 kg/m           This note has been dictated using voice recognition software. Any grammatical, typographical, or context distortions are unintentional and inherent to the software.    Yesica Chavez MD   6/18/2021   [facility] [place]  [address] [place] [address], [address]  [place] [address]   [phone]    Copy to: Matt Grove

## 2021-06-20 ENCOUNTER — HEALTH MAINTENANCE LETTER (OUTPATIENT)
Age: 41
End: 2021-06-20

## 2021-10-11 ENCOUNTER — HEALTH MAINTENANCE LETTER (OUTPATIENT)
Age: 41
End: 2021-10-11

## 2022-05-27 ENCOUNTER — E-VISIT (OUTPATIENT)
Dept: FAMILY MEDICINE | Facility: CLINIC | Age: 42
End: 2022-05-27
Payer: COMMERCIAL

## 2022-05-27 DIAGNOSIS — Z53.9 DIAGNOSIS NOT YET DEFINED: Primary | ICD-10-CM

## 2022-05-31 NOTE — PATIENT INSTRUCTIONS
Thank you for choosing us for your care. I think an in-clinic visit would be best next steps based on your symptoms. Please schedule a clinic appointment; you won t be charged for this eVisit.      You can schedule an appointment right here in [facility], or call [phone]

## 2022-06-22 ENCOUNTER — OFFICE VISIT (OUTPATIENT)
Dept: FAMILY MEDICINE | Facility: CLINIC | Age: 42
End: 2022-06-22
Payer: COMMERCIAL

## 2022-06-22 VITALS
BODY MASS INDEX: 31.01 KG/M2 | RESPIRATION RATE: 16 BRPM | OXYGEN SATURATION: 96 % | WEIGHT: 234 LBS | HEIGHT: 73 IN | TEMPERATURE: 96 F | HEART RATE: 82 BPM | DIASTOLIC BLOOD PRESSURE: 70 MMHG | SYSTOLIC BLOOD PRESSURE: 110 MMHG

## 2022-06-22 DIAGNOSIS — R10.9 ABDOMINAL DISCOMFORT: ICD-10-CM

## 2022-06-22 DIAGNOSIS — K21.9 GASTROESOPHAGEAL REFLUX DISEASE, UNSPECIFIED WHETHER ESOPHAGITIS PRESENT: Primary | ICD-10-CM

## 2022-06-22 LAB
ALBUMIN SERPL-MCNC: 3.7 G/DL (ref 3.4–5)
ALP SERPL-CCNC: 82 U/L (ref 40–150)
ALT SERPL W P-5'-P-CCNC: 49 U/L (ref 0–70)
ANION GAP SERPL CALCULATED.3IONS-SCNC: 2 MMOL/L (ref 3–14)
AST SERPL W P-5'-P-CCNC: 26 U/L (ref 0–45)
BILIRUB SERPL-MCNC: 0.3 MG/DL (ref 0.2–1.3)
BUN SERPL-MCNC: 17 MG/DL (ref 7–30)
CALCIUM SERPL-MCNC: 9.1 MG/DL (ref 8.5–10.1)
CHLORIDE BLD-SCNC: 108 MMOL/L (ref 94–109)
CO2 SERPL-SCNC: 29 MMOL/L (ref 20–32)
CREAT SERPL-MCNC: 0.9 MG/DL (ref 0.66–1.25)
ERYTHROCYTE [DISTWIDTH] IN BLOOD BY AUTOMATED COUNT: 12.3 % (ref 10–15)
GFR SERPL CREATININE-BSD FRML MDRD: >90 ML/MIN/1.73M2
GLUCOSE BLD-MCNC: 98 MG/DL (ref 70–99)
HCT VFR BLD AUTO: 42.7 % (ref 40–53)
HGB BLD-MCNC: 14.7 G/DL (ref 13.3–17.7)
LIPASE SERPL-CCNC: 72 U/L (ref 73–393)
MCH RBC QN AUTO: 31 PG (ref 26.5–33)
MCHC RBC AUTO-ENTMCNC: 34.4 G/DL (ref 31.5–36.5)
MCV RBC AUTO: 90 FL (ref 78–100)
PLATELET # BLD AUTO: 155 10E3/UL (ref 150–450)
POTASSIUM BLD-SCNC: 3.9 MMOL/L (ref 3.4–5.3)
PROT SERPL-MCNC: 7.4 G/DL (ref 6.8–8.8)
RBC # BLD AUTO: 4.74 10E6/UL (ref 4.4–5.9)
SODIUM SERPL-SCNC: 139 MMOL/L (ref 133–144)
WBC # BLD AUTO: 4.5 10E3/UL (ref 4–11)

## 2022-06-22 PROCEDURE — 90715 TDAP VACCINE 7 YRS/> IM: CPT | Performed by: FAMILY MEDICINE

## 2022-06-22 PROCEDURE — 83690 ASSAY OF LIPASE: CPT | Performed by: FAMILY MEDICINE

## 2022-06-22 PROCEDURE — 85027 COMPLETE CBC AUTOMATED: CPT | Performed by: FAMILY MEDICINE

## 2022-06-22 PROCEDURE — 80053 COMPREHEN METABOLIC PANEL: CPT | Performed by: FAMILY MEDICINE

## 2022-06-22 PROCEDURE — 36415 COLL VENOUS BLD VENIPUNCTURE: CPT | Performed by: FAMILY MEDICINE

## 2022-06-22 PROCEDURE — 90471 IMMUNIZATION ADMIN: CPT | Performed by: FAMILY MEDICINE

## 2022-06-22 PROCEDURE — 99213 OFFICE O/P EST LOW 20 MIN: CPT | Mod: 25 | Performed by: FAMILY MEDICINE

## 2022-06-22 ASSESSMENT — PAIN SCALES - GENERAL: PAINLEVEL: NO PAIN (0)

## 2022-06-22 NOTE — PROGRESS NOTES
Assessment & Plan     Gastroesophageal reflux disease, unspecified whether esophagitis present  Abdominal discomfort  Symptoms are likely related to reflux which have not improved with weight loss and watching what he eats.  Discussed conservative cares.  Discussed possible H. pylori testing but patient defers at this time.  We will continue to monitor symptoms and utilize Tums as needed.  If symptoms become more bothersome recommend Prilosec 20 mg daily.  Due to abdominal discomfort we will check some basic labs to ensure no other cause for symptoms.   - CBC with platelets  - Comprehensive metabolic panel (BMP + Alb, Alk Phos, ALT, AST, Total. Bili, TP)  - Lipase    The risks, benefits and treatment options of prescribed medications or other treatments have been discussed with the patient. The patient verbalized their understanding and should call or follow up if no improvement or if they develop further problems.    Follow up if symptoms worsen or not improve.     [place], [facility] GÉNESIS Kendall is a 42 year old, presenting for the following health issues:  Abdominal Pain      History of Present Illness       Reason for visit:  Heartburn with cough    He eats 2-3 servings of fruits and vegetables daily.He consumes 0 sweetened beverage(s) daily.He exercises with enough effort to increase his heart rate 30 to 60 minutes per day.  He exercises with enough effort to increase his heart rate 4 days per week.   He is taking medications regularly.     42 year old male who presents to clinic regarding heart burn.     GERD/Heartburn  Onset/Duration: several months  Description: heartburn with coughing fit, getting better. He has lost weight. Has been limiting his sugar intake.   Intensity: moderate  Progression of Symptoms: improving  Accompanying Signs & Symptoms:  Does it feel like food gets stuck or trouble swallowing: no  Nausea: no  Vomiting (bloody?): no  Abdominal Pain:  "no  Black-Tarry stools: no  Bloody stools: no  History:  Previous similar episodes: YES  Previous ulcers: no  Precipitating factors:   Caffeine use: YES- 1 cup of coffee a day and occassional Coke  Alcohol use: YES- rare  NSAID/Aspirin use: YES- Ibuprofen if he has a headache 400-600 mg, or 800 mg if back pain. Tobacco use: no  Worse with no particular food or drink.  Alleviating factors: None  Therapies tried and outcome:             Lifestyle changes: weight loss, taking probiotics.            Medications: Pepcid (famotidine) and TUMS    Has lost some weight, which has been helping and now reports it isn't severe as it was.   In the last couple of weeks symptoms have only flared up over the last couple of weeks.     Reports symptoms have improved.   Took pepcid one time but made him feel unwell.   TUMS would help but only briefly.   NSAID use a couple times per week.     No black tarry stools.     He reports cough has improved with improvement of reflux symptoms.       Review of Systems   Constitutional, HEENT, cardiovascular, pulmonary, gi and gu systems are negative, except as otherwise noted.      Objective    /70 (BP Location: Left arm, Patient Position: Chair, Cuff Size: Adult Large)   Pulse 82   Temp (!) 96  F (35.6  C) (Tympanic)   Resp 16   Ht 1.865 m (6' 1.43\")   Wt 106.1 kg (234 lb)   SpO2 96%   BMI 30.52 kg/m    Body mass index is 30.52 kg/m .  Physical Exam   General: alert, cooperative, no acute distress   CV: RRR, no murmur  Resp: non-labored breathing, clear to auscultation, no wheezing or rales   Abdomen: Soft, non-tender, no guarding.   Extremities: No peripheral edema, calves non-tender.     .  ..  "

## 2022-06-22 NOTE — PATIENT INSTRUCTIONS
Continue with monitoring of symptoms.     Lab work today.     Consider PPI ( prilosec ) OTC.       Elevate head of bed (see image) with 6- to 8-inch blocks under the bed's legs or by placing a foam wedge under the mattress, particularly if nocturnal symptoms occur; raising only the head with pillows is ineffective  Smoking cessation  Weight loss if BMI is ?25  Eat meals slowly and chew thoroughly (put fork down after every bite and do not  fork until food is chewed and swallowed)  Avoid lying down after meals  Avoid late-night meals  Eliminate medications that may aggravate symptoms  Avoid tight-fitting abdominal garments

## 2022-07-12 ENCOUNTER — VIRTUAL VISIT (OUTPATIENT)
Dept: SLEEP MEDICINE | Facility: CLINIC | Age: 42
End: 2022-07-12
Payer: COMMERCIAL

## 2022-07-12 VITALS — HEIGHT: 74 IN | BODY MASS INDEX: 29.26 KG/M2 | WEIGHT: 228 LBS

## 2022-07-12 DIAGNOSIS — G47.33 OSA (OBSTRUCTIVE SLEEP APNEA): Primary | ICD-10-CM

## 2022-07-12 PROCEDURE — 99213 OFFICE O/P EST LOW 20 MIN: CPT | Mod: 95 | Performed by: PHYSICIAN ASSISTANT

## 2022-07-12 ASSESSMENT — SLEEP AND FATIGUE QUESTIONNAIRES
HOW LIKELY ARE YOU TO NOD OFF OR FALL ASLEEP IN A CAR, WHILE STOPPED FOR A FEW MINUTES IN TRAFFIC: WOULD NEVER DOZE
HOW LIKELY ARE YOU TO NOD OFF OR FALL ASLEEP WHEN YOU ARE A PASSENGER IN A CAR FOR AN HOUR WITHOUT A BREAK: WOULD NEVER DOZE
HOW LIKELY ARE YOU TO NOD OFF OR FALL ASLEEP WHILE SITTING QUIETLY AFTER LUNCH WITHOUT ALCOHOL: SLIGHT CHANCE OF DOZING
HOW LIKELY ARE YOU TO NOD OFF OR FALL ASLEEP WHILE SITTING AND TALKING TO SOMEONE: WOULD NEVER DOZE
HOW LIKELY ARE YOU TO NOD OFF OR FALL ASLEEP WHILE SITTING AND READING: SLIGHT CHANCE OF DOZING
HOW LIKELY ARE YOU TO NOD OFF OR FALL ASLEEP WHILE WATCHING TV: SLIGHT CHANCE OF DOZING
HOW LIKELY ARE YOU TO NOD OFF OR FALL ASLEEP WHILE LYING DOWN TO REST IN THE AFTERNOON WHEN CIRCUMSTANCES PERMIT: HIGH CHANCE OF DOZING
HOW LIKELY ARE YOU TO NOD OFF OR FALL ASLEEP WHILE SITTING INACTIVE IN A PUBLIC PLACE: WOULD NEVER DOZE

## 2022-07-12 NOTE — PROGRESS NOTES
Faiza is a 42 year old who is being evaluated via a billable video visit.      How would you like to obtain your AVS? MyChart  If the video visit is dropped, the invitation should be resent by: [phone]  Will anyone else be joining your video visit? July Garcia      Video-Visit Details    Video Start Time: 12:58PM    Type of service:  Video Visit    Video End Time:1:09 PM    Originating Location (pt. Location): Home    Distant Location (provider location):  [facility]     Platform used for Video Visit: [facility]     Sleep Apnea - Follow-up Visit:    Impression/Plan:  Severe obstructive sleep apnea-  Excellent CPAP compliance and AHI is well controlled on CPAP 7-11 cm/H20. Daytime symptoms are improved.   Will change to auto-CPAP 9-14 cm/H20 for his comfort.  Comprehensive DME order placed.    Faiza Alvarez will follow up in about 2 years or sooner if any concerns    20 minutes spent on day of encounter doing chart review, history and exam, counseling, coordinating plan of care, documentation and further activities as noted above.      Jasmine Obrien PA-C    History of Present Illness:  Chief Complaint   Patient presents with     CPAP supplies       Faiza Alvarez presents for follow-up of their severe sleep apnea, managed with CPAP.     He initially presented with snoring, gasping and excessive daytime sleepiness (ESS 14).    A home sleep apnea test was conducted on April 9, 2021(225#) AHI of 44.7 events per hour.  Time with saturation less than or equal to 88% was 7 minutes.  The oxyhemoglobin saturation dia was 80%.  The patient slept in the supine position.    April 28, 2021. Patient received a Resmed AirSense 10 Auto. Pressures were set at 5-15 cm H2O.    Overall, the patient rates his experience with PAP as 8 (0 poor, 10 great). The mask is comfortable. The mask is not leaking.  He is not snoring with the mask on. He is not having gasp arousals. He is not  having any oral or nasal dryness. The pressure settings feel low some nights.    His PAP interface is Nasal Pillows.    Bedtime is typically 9-11 PM. Usually it takes about 5 minutes to fall asleep with the mask on. Wake time is typically 5-7.  Patient is using PAP therapy 8 hours per night. The patient is usually getting 8 hours of sleep per night.    He does feel rested in the morning.    Total score - [place]: 6 (7/12/2022 12:42 PM)    ResMed   Auto-PAP 7.0 - 11.0 cmH2O 30 day usage data:    93% of days with > 4 hours of use. 0/30 days with no use.   Average use 430 minutes per day.   95%ile Leak 8.63 L/min.   CPAP 95% pressure 9.3 cm.   AHI 5.45 events per hour.       EPWORTH SLEEPINESS SCALE      [place] Sleepiness Scale ( IVETTE Mae  [id number]<br>ESS - [place]/English - Final version - 21 Nov 07 - [facility].) 7/12/2022   Sitting and reading Slight chance of dozing   Watching TV Slight chance of dozing   Sitting, inactive in a public place (e.g. a theatre or a meeting) Would never doze   As a passenger in a car for an hour without a break Would never doze   Lying down to rest in the afternoon when circumstances permit High chance of dozing   Sitting and talking to someone Would never doze   Sitting quietly after a lunch without alcohol Slight chance of dozing   In a car, while stopped for a few minutes in traffic Would never doze   [place] Score (MC) 6   [place] Score (Sleep) 6       INSOMNIA SEVERITY INDEX (CLAUDINE)      Insomnia Severity Index (CLAUDINE) 7/12/2022   Difficulty falling asleep 0   Difficulty staying asleep 0   Problems waking up too early 0   How SATISFIED/DISSATISFIED are you with your CURRENT sleep pattern? 1   How NOTICEABLE to others do you think your sleep problem is in terms of impairing the quality of your life? 0   How WORRIED/DISTRESSED are you about your current sleep problem? 0   To what extent do you consider your sleep problem to INTERFERE with your daily functioning (e.g. daytime  "fatigue, mood, ability to function at work/daily chores, concentration, memory, mood, etc.) CURRENTLY? 0   CLAUDINE Total Score 1       Guidelines for Scoring/Interpretation:  Total score categories:  0-7 = No clinically significant insomnia   8-14 = Subthreshold insomnia   15-21 = Clinical insomnia (moderate severity)  22-28 = Clinical insomnia (severe)  Used via courtesy of www.Evident.io.va.gov with permission from Adria Eugene PhD., [facility]       Past medical/surgical history, family history, social history, medications and allergies were reviewed.        Problem List:  Patient Active Problem List    Diagnosis Date Noted     SHANE (obstructive sleep apnea) 06/18/2021     Priority: Medium     A home sleep apnea test was conducted on April 9, 2021(225#) AHI of 44.7 events per hour.  Time with saturation less than or equal to 88% was 7 minutes.  The oxyhemoglobin saturation dia was 80%.  The patient slept in the supine position.          Ht 1.88 m (6' 2\")   Wt 103.4 kg (228 lb)   BMI 29.27 kg/m    "

## 2022-07-14 ENCOUNTER — TELEPHONE (OUTPATIENT)
Dept: SLEEP MEDICINE | Facility: CLINIC | Age: 42
End: 2022-07-14

## 2022-07-17 ENCOUNTER — HEALTH MAINTENANCE LETTER (OUTPATIENT)
Age: 42
End: 2022-07-17

## 2022-07-19 ENCOUNTER — MYC MEDICAL ADVICE (OUTPATIENT)
Dept: SLEEP MEDICINE | Facility: CLINIC | Age: 42
End: 2022-07-19

## 2022-07-19 DIAGNOSIS — G47.33 OBSTRUCTIVE SLEEP APNEA (ADULT) (PEDIATRIC): Primary | ICD-10-CM

## 2022-07-19 NOTE — NURSING NOTE
DME orders have been automatically faxed to [facility]. Verified on ResMed that CPAP pressures have been updated to: auto-CPAP 9-14 cm/R92-[id number] machine. 2 year appointment reminder will be sent via My Chart. Jemima Whitley, CMA

## 2022-09-24 ENCOUNTER — HEALTH MAINTENANCE LETTER (OUTPATIENT)
Age: 42
End: 2022-09-24

## 2023-05-06 ENCOUNTER — OFFICE VISIT (OUTPATIENT)
Dept: URGENT CARE | Facility: URGENT CARE | Age: 43
End: 2023-05-06
Payer: COMMERCIAL

## 2023-05-06 VITALS
BODY MASS INDEX: 29.27 KG/M2 | DIASTOLIC BLOOD PRESSURE: 88 MMHG | HEART RATE: 76 BPM | WEIGHT: 228 LBS | RESPIRATION RATE: 16 BRPM | TEMPERATURE: 98 F | OXYGEN SATURATION: 98 % | SYSTOLIC BLOOD PRESSURE: 131 MMHG

## 2023-05-06 DIAGNOSIS — K04.7 TOOTH ABSCESS: Primary | ICD-10-CM

## 2023-05-06 PROCEDURE — 99213 OFFICE O/P EST LOW 20 MIN: CPT | Performed by: INTERNAL MEDICINE

## 2023-05-06 NOTE — PROGRESS NOTES
ASSESSMENT AND PLAN:      ICD-10-CM    1. Tooth abscess  K04.7 amoxicillin-clavulanate (AUGMENTIN) 875-125 MG tablet          Patient Instructions       Take antibiotics   Probiotic/yogurt for gut guerrero.    Recheck with dentist    Return if symptoms worsen or fail to improve.        Constanza Lay MD  [facility]    Subjective     Faiza Alvarez is a 43 year old who presents for Patient presents with:  Infection: gums    an established patient of [facility].    Onset of symptoms was 3 day(s) ago.  Course of illness is worsening.      Location: left lower jaw  Context: pain in redness of gumline from crown tooth  Current and Associated symptoms: warmth, swelling and pain  Treatment measures tried include: swishing with Listerine, flossing  Relief from treatment: none      Review of Systems        Objective    /88   Pulse 76   Temp 98  F (36.7  C) (Tympanic)   Resp 16   Wt 103.4 kg (228 lb)   SpO2 98%   BMI 29.27 kg/m    Physical Exam  Vitals reviewed.   Constitutional:       Appearance: Normal appearance.   HENT:      Mouth/Throat:      Comments: left lower jaw 3 molar in -   Red inflamed gum line  Neurological:      Mental Status: He is alert.

## 2023-07-20 ENCOUNTER — OFFICE VISIT (OUTPATIENT)
Dept: URGENT CARE | Facility: URGENT CARE | Age: 43
End: 2023-07-20
Payer: COMMERCIAL

## 2023-07-20 VITALS
OXYGEN SATURATION: 97 % | RESPIRATION RATE: 18 BRPM | SYSTOLIC BLOOD PRESSURE: 155 MMHG | BODY MASS INDEX: 29.66 KG/M2 | TEMPERATURE: 97.4 F | WEIGHT: 231 LBS | DIASTOLIC BLOOD PRESSURE: 93 MMHG | HEART RATE: 92 BPM

## 2023-07-20 DIAGNOSIS — S61.311A LACERATION OF LEFT INDEX FINGER WITHOUT FOREIGN BODY WITH DAMAGE TO NAIL, INITIAL ENCOUNTER: Primary | ICD-10-CM

## 2023-07-20 PROCEDURE — 12002 RPR S/N/AX/GEN/TRNK2.6-7.5CM: CPT | Performed by: PHYSICIAN ASSISTANT

## 2023-07-20 RX ORDER — HYDROCODONE BITARTRATE AND ACETAMINOPHEN 5; 325 MG/1; MG/1
1 TABLET ORAL EVERY 6 HOURS PRN
Qty: 18 TABLET | Refills: 0 | Status: SHIPPED | OUTPATIENT
Start: 2023-07-20 | End: 2023-07-23

## 2023-07-20 ASSESSMENT — ENCOUNTER SYMPTOMS: WOUND: 1

## 2023-07-21 NOTE — PROGRESS NOTES
SUBJECTIVE:   Faiza Alvarez is a 43 year old male presenting with a chief complaint of   Chief Complaint   Patient presents with     Urgent Care     Laceration     Per patient accidentally cut left pointer finger states he wrapped it and came straight here has puncture wounds.                He is an established patient of [facility].  Patient presents with complaints of left hand index finger laceration that occurred at home via table saw.  Patient not wearing gloves.  LHD.      Laceration    Mechanism of injury: table saw  History provided by: Patient  Time of injury was 1 hours(s) ago.    This is a non-work related injury.    Associated symptoms: Denies numbness, weakness, or loss of function    Last tetanus booster within 10 years: Yes, 6/22/22    LACERATION EXAM:   Size of laceration: 3 centimeters  Characteristics of the laceration: jagged  Depth of laceration: superficial  Tendon function intact: Yes  Sensation to light touch intact: Yes  Pulses/capillary refill intact: Yes  Foreign body: No    Picture included in patient's chart: yes    PROCEDURE NOTE:  Anesthesia: Wound was locally injected with 2.5 cc's of  Lidocaine 1% plain  Prepped and draped in the usual sterile fashion  Wound irrigated with sterile water  Wound was explored for any foreign bodies and evaluated for tendon, nerve, vessel or joint involvement.    Closure was simple  Laceration was closed with 4 X 4.0 Nylon interrupted sutures  Bandage was applied  Patient tolerated the procedure well                Review of Systems   Skin: Positive for wound.   All other systems reviewed and are negative.      History reviewed. No pertinent past medical history.  Family History   Problem Relation Age of Onset     Diabetes Father      Hypertension Father      Diabetes Maternal Grandmother      Myocardial Infarction Maternal Grandmother      Cerebrovascular Disease Maternal Grandfather      Alzheimer Disease Paternal Grandfather      Hypertension  Paternal Grandfather      No current outpatient medications on file.     Social History     Tobacco Use     Smoking status: Never     Smokeless tobacco: Never   Substance Use Topics     Alcohol use: Yes     Comment: once a month        OBJECTIVE  BP (!) 155/93   Pulse 92   Temp 97.4  F (36.3  C) (Tympanic)   Resp 18   Wt 104.8 kg (231 lb)   SpO2 97%   BMI 29.66 kg/m      Physical Exam  Vitals and nursing note reviewed.   Constitutional:       Appearance: Normal appearance. He is normal weight.   Eyes:      Extraocular Movements: Extraocular movements intact.      Conjunctiva/sclera: Conjunctivae normal.   Cardiovascular:      Rate and Rhythm: Normal rate.   Skin:     Comments: Left hand index finger with lateral aspect superficial laceration with avulsion.  Laceration, jagged through pad of finger through nail with part of nail missing.    Full ROM.  No tendon involvement.  See pic.  Not currently bleeding.     Neurological:      General: No focal deficit present.      Mental Status: He is alert.   Psychiatric:         Mood and Affect: Mood normal.         Labs:  No results found for this or any previous visit (from the past 24 hour(s)).    X-Ray was not done.    ASSESSMENT:      ICD-10-CM    1. Laceration of left index finger without foreign body with damage to nail, initial encounter  S61.311A            Medical Decision Making:    Differential Diagnosis:  laceration    Serious Comorbid Conditions:  Adult:  reviewed    PLAN:    Laceration:    Keep wound clean and dry for the next 24-48 hours  Ok to shower and get wound wet after 48 hours, but do not soak for 2 weeks  Wound follow-up: Return for suture removal in 10 days  May return to work/school as long as wound is kept clean and dry  Discussed the probability of scarring  Active range of motion exercises encouraged for finger lacerations    Patient will return immediately if they experience redness around the laceration, drainage, worsening pain, or fever.   Discussed possibility of nail irregularity due to injury as well as cosmesis.    Rx for norco.  Discussed SE.  No driving or etoh while taking.        Followup:    If not improving or if condition worsens, follow up with your Primary Care Provider, If not improving or if conditions worsens over the next 12-24 hours, go to the Emergency Department    There are no Patient Instructions on file for this visit.

## 2023-08-05 ENCOUNTER — HEALTH MAINTENANCE LETTER (OUTPATIENT)
Age: 43
End: 2023-08-05

## 2024-07-18 NOTE — PATIENT INSTRUCTIONS
Patient Education   Preventive Care Advice   This is general advice given by our system to help you stay healthy. However, your care team may have specific advice just for you. Please talk to your care team about your preventive care needs.  Nutrition  Eat 5 or more servings of fruits and vegetables each day.  Try wheat bread, brown rice and whole grain pasta (instead of white bread, rice, and pasta).  Get enough calcium and vitamin D. Check the label on foods and aim for 100% of the RDA (recommended daily allowance).  Lifestyle  Exercise at least 150 minutes each week  (30 minutes a day, 5 days a week).  Do muscle strengthening activities 2 days a week. These help control your weight and prevent disease.  No smoking.  Wear sunscreen to prevent skin cancer.  Have a dental exam and cleaning every 6 months.  Yearly exams  See your health care team every year to talk about:  Any changes in your health.  Any medicines your care team has prescribed.  Preventive care, family planning, and ways to prevent chronic diseases.  Shots (vaccines)   HPV shots (up to age 26), if you've never had them before.  Hepatitis B shots (up to age 59), if you've never had them before.  COVID-19 shot: Get this shot when it's due.  Flu shot: Get a flu shot every year.  Tetanus shot: Get a tetanus shot every 10 years.  Pneumococcal, hepatitis A, and RSV shots: Ask your care team if you need these based on your risk.  Shingles shot (for age 50 and up)  General health tests  Diabetes screening:  Starting at age 35, Get screened for diabetes at least every 3 years.  If you are younger than age 35, ask your care team if you should be screened for diabetes.  Cholesterol test: At age 39, start having a cholesterol test every 5 years, or more often if advised.  Bone density scan (DEXA): At age 50, ask your care team if you should have this scan for osteoporosis (brittle bones).  Hepatitis C: Get tested at least once in your life.  STIs (sexually  transmitted infections)  Before age 24: Ask your care team if you should be screened for STIs.  After age 24: Get screened for STIs if you're at risk. You are at risk for STIs (including HIV) if:  You are sexually active with more than one person.  You don't use condoms every time.  You or a partner was diagnosed with a sexually transmitted infection.  If you are at risk for HIV, ask about PrEP medicine to prevent HIV.  Get tested for HIV at least once in your life, whether you are at risk for HIV or not.  Cancer screening tests  Cervical cancer screening: If you have a cervix, begin getting regular cervical cancer screening tests starting at age 21.  Breast cancer scan (mammogram): If you've ever had breasts, begin having regular mammograms starting at age 40. This is a scan to check for breast cancer.  Colon cancer screening: It is important to start screening for colon cancer at age 45.  Have a colonoscopy test every 10 years (or more often if you're at risk) Or, ask your provider about stool tests like a FIT test every year or Cologuard test every 3 years.  To learn more about your testing options, visit:   .  For help making a decision, visit:   https://bit.ly/cz65134.  Prostate cancer screening test: If you have a prostate, ask your care team if a prostate cancer screening test (PSA) at age 55 is right for you.  Lung cancer screening: If you are a current or former smoker ages 50 to 80, ask your care team if ongoing lung cancer screenings are right for you.  For informational purposes only. Not to replace the advice of your health care provider. Copyright   2023 [facility] ([employer]). All rights reserved. Clinically reviewed by the [facility] Transitions Program. [employer] 551104 - REV 01/24.

## 2024-07-25 ENCOUNTER — OFFICE VISIT (OUTPATIENT)
Dept: FAMILY MEDICINE | Facility: CLINIC | Age: 44
End: 2024-07-25
Payer: COMMERCIAL

## 2024-07-25 VITALS
HEART RATE: 86 BPM | SYSTOLIC BLOOD PRESSURE: 129 MMHG | WEIGHT: 249 LBS | RESPIRATION RATE: 16 BRPM | DIASTOLIC BLOOD PRESSURE: 86 MMHG | OXYGEN SATURATION: 97 % | HEIGHT: 74 IN | TEMPERATURE: 97.9 F | BODY MASS INDEX: 31.95 KG/M2

## 2024-07-25 DIAGNOSIS — Z12.11 SCREEN FOR COLON CANCER: ICD-10-CM

## 2024-07-25 DIAGNOSIS — Z00.00 ROUTINE GENERAL MEDICAL EXAMINATION AT A HEALTH CARE FACILITY: Primary | ICD-10-CM

## 2024-07-25 LAB
ANION GAP SERPL CALCULATED.3IONS-SCNC: 10 MMOL/L (ref 7–15)
BUN SERPL-MCNC: 15.1 MG/DL (ref 6–20)
CALCIUM SERPL-MCNC: 9.1 MG/DL (ref 8.8–10.4)
CHLORIDE SERPL-SCNC: 105 MMOL/L (ref 98–107)
CHOLEST SERPL-MCNC: 227 MG/DL
CREAT SERPL-MCNC: 0.96 MG/DL (ref 0.67–1.17)
EGFRCR SERPLBLD CKD-EPI 2021: >90 ML/MIN/1.73M2
FASTING STATUS PATIENT QL REPORTED: YES
FASTING STATUS PATIENT QL REPORTED: YES
GLUCOSE SERPL-MCNC: 118 MG/DL (ref 70–99)
HCO3 SERPL-SCNC: 24 MMOL/L (ref 22–29)
HDLC SERPL-MCNC: 39 MG/DL
LDLC SERPL CALC-MCNC: 160 MG/DL
NONHDLC SERPL-MCNC: 188 MG/DL
POTASSIUM SERPL-SCNC: 4.4 MMOL/L (ref 3.4–5.3)
SODIUM SERPL-SCNC: 139 MMOL/L (ref 135–145)
TRIGL SERPL-MCNC: 141 MG/DL

## 2024-07-25 PROCEDURE — 80048 BASIC METABOLIC PNL TOTAL CA: CPT | Performed by: PHYSICIAN ASSISTANT

## 2024-07-25 PROCEDURE — 99396 PREV VISIT EST AGE 40-64: CPT | Performed by: PHYSICIAN ASSISTANT

## 2024-07-25 PROCEDURE — 80061 LIPID PANEL: CPT | Performed by: PHYSICIAN ASSISTANT

## 2024-07-25 PROCEDURE — 36415 COLL VENOUS BLD VENIPUNCTURE: CPT | Performed by: PHYSICIAN ASSISTANT

## 2024-07-25 SDOH — HEALTH STABILITY: PHYSICAL HEALTH: ON AVERAGE, HOW MANY DAYS PER WEEK DO YOU ENGAGE IN MODERATE TO STRENUOUS EXERCISE (LIKE A BRISK WALK)?: 4 DAYS

## 2024-07-25 ASSESSMENT — SOCIAL DETERMINANTS OF HEALTH (SDOH): HOW OFTEN DO YOU GET TOGETHER WITH FRIENDS OR RELATIVES?: ONCE A WEEK

## 2024-07-25 ASSESSMENT — PAIN SCALES - GENERAL: PAINLEVEL: NO PAIN (0)

## 2024-07-25 NOTE — PROGRESS NOTES
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  Goal regimen diet a little more to protect against, continue regular exercise.  Consider medication option for eating if unable to stop.  Discussed options    Screen for colon cancer  Due, declined today            BMI  Estimated body mass index is 31.97 kg/m  as calculated from the following:    Height as of this encounter: 1.88 m (6' 2\").    Weight as of this encounter: 112.9 kg (249 lb).   Weight management plan: Discussed healthy diet and exercise guidelines    Counseling  Appropriate preventive services were addressed with this patient via screening, questionnaire, or discussion as appropriate for fall prevention, nutrition, physical activity, Tobacco-use cessation, weight loss and cognition.  Checklist reviewing preventive services available has been given to the patient.  Reviewed patient's diet, addressing concerns and/or questions.   He is at risk for psychosocial distress and has been provided with information to reduce risk.       Follow up yearly       Subjective   Faiza is a 44 year old, presenting for the following:  Physical          7/25/2024     6:49 AM   Additional Questions   Roomed by Ines Chacon MA   Accompanied by Self        Health Care Directive  Patient does not have a Health Care Directive or Living Will: Discussed advance care planning with patient; however, patient declined at this time.      HPI          7/25/2024   General Health   How would you rate your overall physical health? Good   Feel stress (tense, anxious, or unable to sleep) Only a little    "   (!) STRESS CONCERN      7/25/2024   Nutrition   Three or more servings of calcium each day? (!) NO   Diet: Regular (no restrictions)   How many servings of fruit and vegetables per day? (!) 2-3   How many sweetened beverages each day? (!) 2            7/25/2024   Exercise   Days per week of moderate/strenous exercise 4 days            7/25/2024   Social Factors   Frequency of gathering with friends or relatives Once a week   Worry food won't last until get money to buy more No   Food not last or not have enough money for food? No   Do you have housing? (Housing is defined as stable permanent housing and does not include staying ouside in a car, in a tent, in an abandoned building, in an overnight shelter, or couch-surfing.) Yes   Are you worried about losing your housing? No   Lack of transportation? No   Unable to get utilities (heat,electricity)? No            7/25/2024   Dental   Dentist two times every year? Yes            7/25/2024   TB Screening   Were you born outside of the [place]? No            Today's PHQ-2 Score:       7/25/2024     6:51 AM   PHQ-2 ( 1999 [employer])   Q1: Little interest or pleasure in doing things 0   Q2: Feeling down, depressed or hopeless 0   PHQ-2 Score 0   Q1: Little interest or pleasure in doing things Not at all   Q2: Feeling down, depressed or hopeless Not at all   PHQ-2 Score 0            7/25/2024   Substance Use   Alcohol more than 3/day or more than 7/wk No   Do you use any other substances recreationally? No           Social History     Tobacco Use    Smoking status: Never    Smokeless tobacco: Never   Vaping Use    Vaping status: Never Used   Substance Use Topics    Alcohol use: Yes     Comment: once a month     Drug use: Never           7/25/2024   STI Screening   New sexual partner(s) since last STI/HIV test? No        ASCVD Risk   The 10-year ASCVD risk score (Jordana DOHERTY, et al., 2019) is: 2.7%    Values used to calculate the score:      Age: 44 years      Sex: Male     "  Is Non- : No      Diabetic: No      Tobacco smoker: No      Systolic Blood Pressure: 129 mmHg      Is BP treated: No      HDL Cholesterol: 49 mg/dL      Total Cholesterol: 233 mg/dL          7/25/2024   Contraception/Family Planning   Questions about contraception or family planning No           Reviewed and updated as needed this visit by Provider   Tobacco  Allergies  Meds  Problems  Med Hx  Surg Hx  Fam Hx            History reviewed. No pertinent past medical history.  History reviewed. No pertinent surgical history.  Labs reviewed in EPIC         Objective    Exam  /86   Pulse 86   Temp 97.9  F (36.6  C) (Tympanic)   Resp 16   Ht 1.88 m (6' 2\")   Wt 112.9 kg (249 lb)   SpO2 97%   BMI 31.97 kg/m     Estimated body mass index is 31.97 kg/m  as calculated from the following:    Height as of this encounter: 1.88 m (6' 2\").    Weight as of this encounter: 112.9 kg (249 lb).      Physical Exam  GENERAL: alert and no distress  EYES: Eyes grossly normal to inspection, PERRL and conjunctivae and sclerae normal  HENT: ear canals and TM's normal, nose and mouth without ulcers or lesions  NECK: no adenopathy, no asymmetry, masses, or scars  RESP: lungs clear to auscultation - no rales, rhonchi or wheezes  CV: regular rate and rhythm, normal S1 S2, no S3 or S4, no murmur, click or rub, no peripheral edema  ABDOMEN: soft, nontender, no hepatosplenomegaly, no masses and bowel sounds normal  SKIN: no suspicious lesions or rashes        Signed Electronically by: Jm Mitchell PA-C    "

## 2024-07-29 ENCOUNTER — MYC MEDICAL ADVICE (OUTPATIENT)
Dept: FAMILY MEDICINE | Facility: CLINIC | Age: 44
End: 2024-07-29
Payer: COMMERCIAL

## 2024-07-29 DIAGNOSIS — R73.03 PREDIABETES: Primary | ICD-10-CM

## 2024-07-29 RX ORDER — METFORMIN HCL 500 MG
500 TABLET, EXTENDED RELEASE 24 HR ORAL
Qty: 90 TABLET | Refills: 1 | Status: SHIPPED | OUTPATIENT
Start: 2024-07-29

## 2024-09-18 ENCOUNTER — MYC MEDICAL ADVICE (OUTPATIENT)
Dept: FAMILY MEDICINE | Facility: CLINIC | Age: 44
End: 2024-09-18
Payer: COMMERCIAL

## 2024-09-18 NOTE — TELEPHONE ENCOUNTER
MyChart reply sent to patient and closing encounter.     Alice Nation RN  [facility]

## 2025-06-30 ENCOUNTER — PATIENT OUTREACH (OUTPATIENT)
Dept: CARE COORDINATION | Facility: CLINIC | Age: 45
End: 2025-06-30
Payer: COMMERCIAL

## 2025-07-14 ENCOUNTER — PATIENT OUTREACH (OUTPATIENT)
Dept: CARE COORDINATION | Facility: CLINIC | Age: 45
End: 2025-07-14
Payer: COMMERCIAL

## 2025-08-30 ENCOUNTER — HEALTH MAINTENANCE LETTER (OUTPATIENT)
Age: 45
End: 2025-08-30